# Patient Record
Sex: FEMALE | Race: WHITE | NOT HISPANIC OR LATINO | Employment: OTHER
[De-identification: names, ages, dates, MRNs, and addresses within clinical notes are randomized per-mention and may not be internally consistent; named-entity substitution may affect disease eponyms.]

---

## 2017-06-24 ENCOUNTER — HEALTH MAINTENANCE LETTER (OUTPATIENT)
Age: 62
End: 2017-06-24

## 2020-08-21 DIAGNOSIS — Z11.59 ENCOUNTER FOR SCREENING FOR OTHER VIRAL DISEASES: Primary | ICD-10-CM

## 2020-08-22 DIAGNOSIS — Z11.59 ENCOUNTER FOR SCREENING FOR OTHER VIRAL DISEASES: ICD-10-CM

## 2020-08-22 PROCEDURE — U0003 INFECTIOUS AGENT DETECTION BY NUCLEIC ACID (DNA OR RNA); SEVERE ACUTE RESPIRATORY SYNDROME CORONAVIRUS 2 (SARS-COV-2) (CORONAVIRUS DISEASE [COVID-19]), AMPLIFIED PROBE TECHNIQUE, MAKING USE OF HIGH THROUGHPUT TECHNOLOGIES AS DESCRIBED BY CMS-2020-01-R: HCPCS | Performed by: FAMILY MEDICINE

## 2020-08-23 LAB
SARS-COV-2 RNA SPEC QL NAA+PROBE: NOT DETECTED
SPECIMEN SOURCE: NORMAL

## 2020-08-24 ENCOUNTER — ANESTHESIA EVENT (OUTPATIENT)
Dept: SURGERY | Facility: CLINIC | Age: 65
End: 2020-08-24
Payer: COMMERCIAL

## 2020-08-24 ENCOUNTER — HOSPITAL ENCOUNTER (OUTPATIENT)
Facility: CLINIC | Age: 65
Discharge: HOME | End: 2020-08-24
Attending: UROLOGY | Admitting: UROLOGY
Payer: COMMERCIAL

## 2020-08-24 ENCOUNTER — ANESTHESIA (OUTPATIENT)
Dept: SURGERY | Facility: CLINIC | Age: 65
End: 2020-08-24
Payer: COMMERCIAL

## 2020-08-24 VITALS
OXYGEN SATURATION: 100 % | HEIGHT: 67 IN | HEART RATE: 94 BPM | TEMPERATURE: 95.4 F | SYSTOLIC BLOOD PRESSURE: 127 MMHG | DIASTOLIC BLOOD PRESSURE: 76 MMHG | WEIGHT: 154.7 LBS | RESPIRATION RATE: 14 BRPM | BODY MASS INDEX: 24.28 KG/M2

## 2020-08-24 DIAGNOSIS — N36.8 URETHRAL PROLAPSE: Primary | ICD-10-CM

## 2020-08-24 PROCEDURE — 25800030 ZZH RX IP 258 OP 636: Performed by: NURSE ANESTHETIST, CERTIFIED REGISTERED

## 2020-08-24 PROCEDURE — 88342 IMHCHEM/IMCYTCHM 1ST ANTB: CPT | Mod: 26 | Performed by: UROLOGY

## 2020-08-24 PROCEDURE — 36000050 ZZH SURGERY LEVEL 2 1ST 30 MIN: Performed by: UROLOGY

## 2020-08-24 PROCEDURE — 40000170 ZZH STATISTIC PRE-PROCEDURE ASSESSMENT II: Performed by: UROLOGY

## 2020-08-24 PROCEDURE — 88305 TISSUE EXAM BY PATHOLOGIST: CPT | Mod: 26 | Performed by: UROLOGY

## 2020-08-24 PROCEDURE — 88341 IMHCHEM/IMCYTCHM EA ADD ANTB: CPT | Performed by: UROLOGY

## 2020-08-24 PROCEDURE — 25000566 ZZH SEVOFLURANE, EA 15 MIN: Performed by: UROLOGY

## 2020-08-24 PROCEDURE — 27210794 ZZH OR GENERAL SUPPLY STERILE: Performed by: UROLOGY

## 2020-08-24 PROCEDURE — 25000128 H RX IP 250 OP 636: Performed by: NURSE ANESTHETIST, CERTIFIED REGISTERED

## 2020-08-24 PROCEDURE — 37000008 ZZH ANESTHESIA TECHNICAL FEE, 1ST 30 MIN: Performed by: UROLOGY

## 2020-08-24 PROCEDURE — 71000027 ZZH RECOVERY PHASE 2 EACH 15 MINS: Performed by: UROLOGY

## 2020-08-24 PROCEDURE — 25000125 ZZHC RX 250: Performed by: NURSE ANESTHETIST, CERTIFIED REGISTERED

## 2020-08-24 PROCEDURE — 88342 IMHCHEM/IMCYTCHM 1ST ANTB: CPT | Performed by: UROLOGY

## 2020-08-24 PROCEDURE — 71000013 ZZH RECOVERY PHASE 1 LEVEL 1 EA ADDTL HR: Performed by: UROLOGY

## 2020-08-24 PROCEDURE — 25000132 ZZH RX MED GY IP 250 OP 250 PS 637: Performed by: UROLOGY

## 2020-08-24 PROCEDURE — 25000128 H RX IP 250 OP 636: Performed by: UROLOGY

## 2020-08-24 PROCEDURE — 37000009 ZZH ANESTHESIA TECHNICAL FEE, EACH ADDTL 15 MIN: Performed by: UROLOGY

## 2020-08-24 PROCEDURE — 36000052 ZZH SURGERY LEVEL 2 EA 15 ADDTL MIN: Performed by: UROLOGY

## 2020-08-24 PROCEDURE — 88305 TISSUE EXAM BY PATHOLOGIST: CPT | Performed by: UROLOGY

## 2020-08-24 PROCEDURE — 88341 IMHCHEM/IMCYTCHM EA ADD ANTB: CPT | Mod: 26 | Performed by: UROLOGY

## 2020-08-24 PROCEDURE — 71000012 ZZH RECOVERY PHASE 1 LEVEL 1 FIRST HR: Performed by: UROLOGY

## 2020-08-24 PROCEDURE — 25000125 ZZHC RX 250: Performed by: UROLOGY

## 2020-08-24 RX ORDER — MEPERIDINE HYDROCHLORIDE 25 MG/ML
12.5 INJECTION INTRAMUSCULAR; INTRAVENOUS; SUBCUTANEOUS
Status: DISCONTINUED | OUTPATIENT
Start: 2020-08-24 | End: 2020-08-24 | Stop reason: HOSPADM

## 2020-08-24 RX ORDER — CEFAZOLIN SODIUM 1 G/3ML
1 INJECTION, POWDER, FOR SOLUTION INTRAMUSCULAR; INTRAVENOUS SEE ADMIN INSTRUCTIONS
Status: DISCONTINUED | OUTPATIENT
Start: 2020-08-24 | End: 2020-08-24 | Stop reason: HOSPADM

## 2020-08-24 RX ORDER — DEXAMETHASONE SODIUM PHOSPHATE 4 MG/ML
INJECTION, SOLUTION INTRA-ARTICULAR; INTRALESIONAL; INTRAMUSCULAR; INTRAVENOUS; SOFT TISSUE PRN
Status: DISCONTINUED | OUTPATIENT
Start: 2020-08-24 | End: 2020-08-24

## 2020-08-24 RX ORDER — SODIUM CHLORIDE, SODIUM LACTATE, POTASSIUM CHLORIDE, CALCIUM CHLORIDE 600; 310; 30; 20 MG/100ML; MG/100ML; MG/100ML; MG/100ML
INJECTION, SOLUTION INTRAVENOUS CONTINUOUS PRN
Status: DISCONTINUED | OUTPATIENT
Start: 2020-08-24 | End: 2020-08-24

## 2020-08-24 RX ORDER — FENTANYL CITRATE 50 UG/ML
25-50 INJECTION, SOLUTION INTRAMUSCULAR; INTRAVENOUS
Status: CANCELLED | OUTPATIENT
Start: 2020-08-24

## 2020-08-24 RX ORDER — CYCLOSPORINE 0.5 MG/ML
1 EMULSION OPHTHALMIC 2 TIMES DAILY
COMMUNITY

## 2020-08-24 RX ORDER — HYDRALAZINE HYDROCHLORIDE 20 MG/ML
2.5-5 INJECTION INTRAMUSCULAR; INTRAVENOUS EVERY 10 MIN PRN
Status: DISCONTINUED | OUTPATIENT
Start: 2020-08-24 | End: 2020-08-24 | Stop reason: HOSPADM

## 2020-08-24 RX ORDER — LIDOCAINE HYDROCHLORIDE 20 MG/ML
INJECTION, SOLUTION INFILTRATION; PERINEURAL PRN
Status: DISCONTINUED | OUTPATIENT
Start: 2020-08-24 | End: 2020-08-24

## 2020-08-24 RX ORDER — HYDROMORPHONE HYDROCHLORIDE 1 MG/ML
.3-.5 INJECTION, SOLUTION INTRAMUSCULAR; INTRAVENOUS; SUBCUTANEOUS EVERY 10 MIN PRN
Status: DISCONTINUED | OUTPATIENT
Start: 2020-08-24 | End: 2020-08-24 | Stop reason: HOSPADM

## 2020-08-24 RX ORDER — ONDANSETRON 2 MG/ML
4 INJECTION INTRAMUSCULAR; INTRAVENOUS EVERY 30 MIN PRN
Status: DISCONTINUED | OUTPATIENT
Start: 2020-08-24 | End: 2020-08-24 | Stop reason: HOSPADM

## 2020-08-24 RX ORDER — PROPOFOL 10 MG/ML
INJECTION, EMULSION INTRAVENOUS PRN
Status: DISCONTINUED | OUTPATIENT
Start: 2020-08-24 | End: 2020-08-24

## 2020-08-24 RX ORDER — OXYCODONE HYDROCHLORIDE 5 MG/1
5 TABLET ORAL EVERY 6 HOURS PRN
Qty: 6 TABLET | Refills: 0 | Status: SHIPPED | OUTPATIENT
Start: 2020-08-24 | End: 2020-08-27

## 2020-08-24 RX ORDER — IBANDRONATE SODIUM 150 MG/1
150 TABLET, FILM COATED ORAL
Status: ON HOLD | COMMUNITY
End: 2020-08-24

## 2020-08-24 RX ORDER — DEXAMETHASONE SODIUM PHOSPHATE 4 MG/ML
4 INJECTION, SOLUTION INTRA-ARTICULAR; INTRALESIONAL; INTRAMUSCULAR; INTRAVENOUS; SOFT TISSUE EVERY 10 MIN PRN
Status: DISCONTINUED | OUTPATIENT
Start: 2020-08-24 | End: 2020-08-24 | Stop reason: HOSPADM

## 2020-08-24 RX ORDER — GLYCOPYRROLATE 0.2 MG/ML
INJECTION, SOLUTION INTRAMUSCULAR; INTRAVENOUS PRN
Status: DISCONTINUED | OUTPATIENT
Start: 2020-08-24 | End: 2020-08-24

## 2020-08-24 RX ORDER — SODIUM CHLORIDE, SODIUM LACTATE, POTASSIUM CHLORIDE, CALCIUM CHLORIDE 600; 310; 30; 20 MG/100ML; MG/100ML; MG/100ML; MG/100ML
INJECTION, SOLUTION INTRAVENOUS CONTINUOUS
Status: DISCONTINUED | OUTPATIENT
Start: 2020-08-24 | End: 2020-08-24 | Stop reason: HOSPADM

## 2020-08-24 RX ORDER — EPHEDRINE SULFATE 50 MG/ML
INJECTION, SOLUTION INTRAMUSCULAR; INTRAVENOUS; SUBCUTANEOUS PRN
Status: DISCONTINUED | OUTPATIENT
Start: 2020-08-24 | End: 2020-08-24

## 2020-08-24 RX ORDER — ALENDRONATE SODIUM 70 MG/1
TABLET ORAL
COMMUNITY
Start: 2020-06-18

## 2020-08-24 RX ORDER — OXYCODONE HYDROCHLORIDE 5 MG/1
5 TABLET ORAL ONCE
Status: COMPLETED | OUTPATIENT
Start: 2020-08-24 | End: 2020-08-24

## 2020-08-24 RX ORDER — CEFAZOLIN SODIUM 2 G/100ML
2 INJECTION, SOLUTION INTRAVENOUS
Status: COMPLETED | OUTPATIENT
Start: 2020-08-24 | End: 2020-08-24

## 2020-08-24 RX ORDER — NALOXONE HYDROCHLORIDE 0.4 MG/ML
.1-.4 INJECTION, SOLUTION INTRAMUSCULAR; INTRAVENOUS; SUBCUTANEOUS
Status: DISCONTINUED | OUTPATIENT
Start: 2020-08-24 | End: 2020-08-24 | Stop reason: HOSPADM

## 2020-08-24 RX ORDER — CEPHALEXIN 500 MG/1
500 CAPSULE ORAL 3 TIMES DAILY
Qty: 15 CAPSULE | Refills: 0 | Status: SHIPPED | OUTPATIENT
Start: 2020-08-24 | End: 2020-08-29

## 2020-08-24 RX ORDER — ONDANSETRON 4 MG/1
4 TABLET, ORALLY DISINTEGRATING ORAL EVERY 30 MIN PRN
Status: DISCONTINUED | OUTPATIENT
Start: 2020-08-24 | End: 2020-08-24 | Stop reason: HOSPADM

## 2020-08-24 RX ORDER — FENTANYL CITRATE 0.05 MG/ML
25-50 INJECTION, SOLUTION INTRAMUSCULAR; INTRAVENOUS
Status: DISCONTINUED | OUTPATIENT
Start: 2020-08-24 | End: 2020-08-24 | Stop reason: HOSPADM

## 2020-08-24 RX ORDER — LABETALOL HYDROCHLORIDE 5 MG/ML
10 INJECTION, SOLUTION INTRAVENOUS
Status: DISCONTINUED | OUTPATIENT
Start: 2020-08-24 | End: 2020-08-24 | Stop reason: HOSPADM

## 2020-08-24 RX ADMIN — Medication 10 MG: at 09:17

## 2020-08-24 RX ADMIN — GLYCOPYRROLATE 0.2 MG: 0.2 INJECTION, SOLUTION INTRAMUSCULAR; INTRAVENOUS at 09:42

## 2020-08-24 RX ADMIN — PHENYLEPHRINE HYDROCHLORIDE 100 MCG: 10 INJECTION INTRAVENOUS at 09:26

## 2020-08-24 RX ADMIN — OXYCODONE HYDROCHLORIDE 5 MG: 5 TABLET ORAL at 11:09

## 2020-08-24 RX ADMIN — CEFAZOLIN SODIUM 2 G: 2 INJECTION, SOLUTION INTRAVENOUS at 09:14

## 2020-08-24 RX ADMIN — MIDAZOLAM 2 MG: 1 INJECTION INTRAMUSCULAR; INTRAVENOUS at 09:06

## 2020-08-24 RX ADMIN — Medication 5 MG: at 09:15

## 2020-08-24 RX ADMIN — SODIUM CHLORIDE, POTASSIUM CHLORIDE, SODIUM LACTATE AND CALCIUM CHLORIDE: 600; 310; 30; 20 INJECTION, SOLUTION INTRAVENOUS at 09:53

## 2020-08-24 RX ADMIN — Medication 5 MG: at 09:45

## 2020-08-24 RX ADMIN — SODIUM CHLORIDE, POTASSIUM CHLORIDE, SODIUM LACTATE AND CALCIUM CHLORIDE: 600; 310; 30; 20 INJECTION, SOLUTION INTRAVENOUS at 09:05

## 2020-08-24 RX ADMIN — LIDOCAINE HYDROCHLORIDE 100 MG: 20 INJECTION, SOLUTION INFILTRATION; PERINEURAL at 09:10

## 2020-08-24 RX ADMIN — Medication 5 MG: at 09:26

## 2020-08-24 RX ADMIN — DEXAMETHASONE SODIUM PHOSPHATE 4 MG: 4 INJECTION, SOLUTION INTRA-ARTICULAR; INTRALESIONAL; INTRAMUSCULAR; INTRAVENOUS; SOFT TISSUE at 09:29

## 2020-08-24 RX ADMIN — PHENYLEPHRINE HYDROCHLORIDE 100 MCG: 10 INJECTION INTRAVENOUS at 09:45

## 2020-08-24 RX ADMIN — PROPOFOL 180 MG: 10 INJECTION, EMULSION INTRAVENOUS at 09:10

## 2020-08-24 NOTE — OR NURSING
Dr. Campoverde told this RN to have patient just keep regular bag on patient rather than switch to leg bag.  Catheter will be removed at office in AM.

## 2020-08-24 NOTE — OP NOTE
[facility] Urology Brief Operative Note    Pre-operative diagnosis: Urethral prolapse   Post-operative diagnosis: Same   Procedure: Procedure(s):  CYSTOSCOPY, WITH REPAIR URETHRAL PROLAPSE   Surgeon: Tavo Campoverde MD, MD   Assistant(s): none   Anesthesia: LMA   Estimated blood loss: Less than 10 ml   Total IV fluids: (See anesthesia record)   Blood transfusion: No transfusion was given during surgery   Total urine output: Not measured   Drains: Lugo catheter   Specimens: Urethral prolapse   Implants: None   Findings: 2-3cm urethral prolapse excised   Complications: None   Condition: Stable   Comments: See dictated operative report for full details.    Tavo Campoverde MD, MD

## 2020-08-24 NOTE — ANESTHESIA PREPROCEDURE EVALUATION
"Anesthesia Pre-Procedure Evaluation    Patient: Ivon Sales   MRN: [id number] : 1955          Preoperative Diagnosis: Urethral polyp [N36.2]    Procedure(s):  CYSTOSCOPY, WITH REPAIR URETHRAL PROLAPSE    No past medical history on file.  No past surgical history on file.    Anesthesia Evaluation     . Pt has had prior anesthetic.     No history of anesthetic complications          ROS/MED HX    ENT/Pulmonary:  - neg pulmonary ROS    (-) sleep apnea   Neurologic:  - neg neurologic ROS     Cardiovascular:  - neg cardiovascular ROS      (-) hypertension   METS/Exercise Tolerance:     Hematologic:         Musculoskeletal: Comment: osteoporosis  (+) arthritis,  -       GI/Hepatic:  - neg GI/hepatic ROS      (-) GERD   Renal/Genitourinary:  - ROS Renal section negative       Endo:  - neg endo ROS       Psychiatric:         Infectious Disease:         Malignancy:         Other:                          Physical Exam  Normal systems: cardiovascular, pulmonary and dental    Airway   Mallampati: II  TM distance: >3 FB  Neck ROM: full    Dental     Cardiovascular       Pulmonary             No results found for: WBC, HGB, HCT, PLT, CRP, SED, NA, POTASSIUM, CHLORIDE, CO2, BUN, CR, GLC, WILLIE, PHOS, MAG, ALBUMIN, PROTTOTAL, ALT, AST, GGT, ALKPHOS, BILITOTAL, BILIDIRECT, LIPASE, AMYLASE, CARYN, PTT, INR, FIBR, TSH, T4, T3, HCG, HCGS, CKTOTAL, CKMB, TROPN    Preop Vitals  BP Readings from Last 3 Encounters:   20 124/81   01/22/15 118/70   09 104/64    Pulse Readings from Last 3 Encounters:   20 90   01/22/15 74   09 84      Resp Readings from Last 3 Encounters:   20 18   11 12   09 16    SpO2 Readings from Last 3 Encounters:   20 93%      Temp Readings from Last 1 Encounters:   20 36.1  C (97  F) (Temporal)    Ht Readings from Last 1 Encounters:   20 1.689 m (5' 6.5\")      Wt Readings from Last 1 Encounters:   20 70.2 kg (154 lb 11.2 oz)    Estimated body " "mass index is 24.6 kg/m  as calculated from the following:    Height as of this encounter: 1.689 m (5' 6.5\").    Weight as of this encounter: 70.2 kg (154 lb 11.2 oz).       Anesthesia Plan      History & Physical Review  History and physical reviewed and following examination; no interval change.    ASA Status:  2 .    NPO Status:  > 8 hours    Plan for General with Intravenous and Propofol induction. Maintenance will be Balanced.    PONV prophylaxis:  Ondansetron (or other 5HT-3) and Dexamethasone or Solumedrol         Postoperative Care  Postoperative pain management:  Multi-modal analgesia.      Consents  Anesthetic plan, risks, benefits and alternatives discussed with:  Patient..                 Tatyana Feliz MD  "

## 2020-08-24 NOTE — DISCHARGE INSTRUCTIONS
Cystoscopy Discharge Instructions      Diet:    Return to the diet that you were on before the procedure, unless you are given specific diet instructions.    It is important to drink 6-8 glasses of fluids per day at home - at least 3-4 glasses should be water.    Activity:    Walk short distances and increase as your strength allows.    You may climb stairs.    Do not do strenuous exercise or heavy lifting until approved by surgeon.    Do not drive while taking narcotic pain medications.    Bathing:    You may take a shower.    Call your physician if these signs/symptoms are present:    Pain that is not relieved by a short rest or ordered pain medications.    Temperature at or above 101.0 F or chills.    Inability or difficulty urinating.  Excessive blood in urine.    Any questions or concerns.    DISCHARGE INSTRUCTIONS FOR   CATHETER CARE AT HOME      .      Basic Catheter Care  1. Always wash hands before and after handling your catheter.  2. Use soap and water to wash the area around your catheter.  3. Do this procedure twice a day.  4. Proper cleansing will help keep the area from becoming irritated or infected.    Leg Bag  This is a small plastic bag that collects urine draining from your catheter and then strapped around your thigh. It will need to be emptied when the bag is 1/2 to 3/4 full.     Large Drainage Bag  1. This bag is larger than the leg bag and holds more urine.  It is to be used while at home, especially at night.    2. Before you go to bed, change the leg bag to the large drainage bag.  3. Pinch off the catheter with your fingers and swab the connection between the catheter and leg bag with alcohol sponge.  4. Disconnect the leg bag and connect the large drainage bag to your catheter.  5. When you get into bed, arrange the drainage tubing so that it doesn t kink.  6. Be sure to keep the bag below the level of your bladder and allow enough slack for turning.    Cleaning Your Drainage  Bags    1. Wash hands.  2. Using funnel or syringe, fill the bag half full with a solution of 1/2  vinegar and 1/2 water.  3. Shake bag, allowing mixture to cleanse inside of bag.  4. Empty out all vinegar and water mixture from your bag.  5. Hang bag to dry when not in use.  6. Clean your bags anytime you change them.      Helpful Hints  1. Always keep drainage bags below bladder level to insure adequate drainage.  2. Drink 4-6 glasses of water daily along with other fluids you normally drink to keep urine free of infection and / or clots.  3. If you notice no urine in your bag for 2 to 4 hours or you develop extreme discomfort in bladder area, your catheter maybe plugged.  Notify your doctor.  4. If you notice your urine becomes foul smelling and cloudy, notify your doctor.  Also notify your doctor if you develop fever or chills.  5. If you notice urine leaking around the outside of the catheter, check to be sure catheter or tubing is not kinked.  6. Don t use leg bag while in bed.    Same Day Surgery Discharge Instructions for  Sedation and General Anesthesia       It's not unusual to feel dizzy, light-headed or faint for up to 24 hours after surgery or while taking pain medication.  If you have these symptoms: sit for a few minutes before standing and have someone assist you when you get up to walk or use the bathroom.      You should rest and relax for the next 24 hours. We recommend you make arrangements to have an adult stay with you for at least 24 hours after your discharge.  Avoid hazardous and strenuous activity.      DO NOT DRIVE any vehicle or operate mechanical equipment for 24 hours following the end of your surgery.  Even though you may feel normal, your reactions may be affected by the medication you have received.      Do not drink alcoholic beverages for 24 hours following surgery.       Slowly progress to your regular diet as you feel able. It's not unusual to feel nauseated and/or vomit after  receiving anesthesia.  If you develop these symptoms, drink clear liquids (apple juice, ginger ale, broth, 7-up, etc. ) until you feel better.  If your nausea and vomiting persists for 24 hours, please notify your surgeon.        All narcotic pain medications, along with inactivity and anesthesia, can cause constipation. Drinking plenty of liquids and increasing fiber intake will help.      For any questions of a medical nature, call your surgeon.      Do not make important decisions for 24 hours.      If you had general anesthesia, you may have a sore throat for a couple of days related to the breathing tube used during surgery.  You may use Cepacol lozenges to help with this discomfort.  If it worsens or if you develop a fever, contact your surgeon.       If you feel your pain is not well managed with the pain medications prescribed by your surgeon, please contact your surgeon's office to let them know so they can address your concerns.       CoVid 19 Information    We want to give you information regarding Covid. Please consult your primary care provider with any questions you might have.     Patient who have symptoms (cough, fever, or shortness of breath), need to isolate for 7 days from when symptoms started OR 72 hours after fever resolves (without fever reducing medications) AND improvement of respiratory symptoms (whichever is longer).      Isolate yourself at home (in own room/own bathroom if possible)    Do Not allow any visitors    Do Not go to work or school    Do Not go to [employer],  centers, shopping, or other public places.    Do Not shake hands.    Avoid close and intimate contact with others (hugging, kissing).    Follow CDC recommendations for household cleaning of frequently touched services.     After the initial 7 days, continue to isolate yourself from household members as much as possible. To continue decrease the risk of community spread and exposure, you and any members of your  household should limit activities in public for 14 days after starting home isolation.     You can reference the following CDC link for helpful home isolation/care tips:  https://www.cdc.gov/coronavirus/2019-ncov/downloads/10Things.pdf    Protect Others:    Cover Your Mouth and Nose with a mask, disposable tissue or wash cloth to avoid spreading germs to others.    Wash your hands and face frequently with soap and water    Call Your Primary Doctor If: Breathing difficulty develops or you become worse.    For more information about COVID19 and options for caring for yourself at home, please visit the CDC website at https://www.cdc.gov/coronavirus/2019-ncov/about/steps-when-sick.html  For more options for care at [facility], please visit our website at [email or web address]    **If you have questions or concerns about your procedure,   call Dr. Campoverde at [phone]**    Pt will have catheter removed at clinic Tuesday AM.  Call in AM to let them know you are coming.

## 2020-08-24 NOTE — OP NOTE
Procedure Date: 2020      PROCEDURES:  Cystoscopy and repair of urethral prolapse.      PREOPERATIVE DIAGNOSIS:  Urethral prolapse.      POSTOPERATIVE DIAGNOSIS:  Urethral prolapse.      OPERATIVE NOTE:  Informed consent was obtained from the patient.  She was brought to the operating room, given laryngeal mask anesthesia, and placed in lithotomy position.  Sterile prep and drape were applied and surgical timeout was taken.  A well-lubricated cystoscope was inserted into the urethra and inspection of the bladder and urethral mucosa was performed.  This revealed no evidence of urethral lesions.  No inflammatory or neoplastic changes in the bladder, and the bladder mucosa appeared normal.  Scope was removed, and a 22-Serbian Lugo catheter was placed into the bladder.  The balloon was inflated with 10 mL sterile water and the bladder was drained  this way.  Local infiltration around the urethra with 1% lidocaine with epinephrine was done.  Using electrocautery, the prolapsed portion of the urethral mucosa was excised from 11 o'clock position clockwise to about the 8 o'clock position.  This tissue was sent for pathology.  The urethral edges and the meatal edge were then approximated with interrupted 3-0 chromic sutures.  Once this was done, inspection was performed and revealed excellent hemostasis.  The vaginal speculum, which had been placed at the initiation of the procedure, was then removed and procedure was terminated at that point.  She tolerated the procedure well with no complications.      Blood loss 5 mL, and a total of 7 mL of the local anesthesia was used.  The Lugo catheter will be left in place overnight.  She will go to the office tomorrow and the nurse will schedule to get a trial of voiding done.         NEAL CORBIN MD             D: 2020   T: 2020   MT: GIDEON      Name:     FRANCES NATH   MRN:      [id number]        Account:        [id number]   :      1955            Procedure Date: 08/24/2020      Document: [id number]       cc: Tavo Campoverde MD

## 2020-08-24 NOTE — ANESTHESIA CARE TRANSFER NOTE
Patient: Ivon Sales    Procedure(s):  CYSTOSCOPY WITH REPAIR URETHRAL PROLAPSE    Diagnosis: Urethral polyp [N36.2]  Diagnosis Additional Information: No value filed.    Anesthesia Type:   General     Note:  Airway :Face Mask  Patient transferred to:Phase II  Comments: At end of procedure, spontaneous respirations, adequate tidal volumes, followed commands to voice, LMA removed atraumatically, oropharynx suctioned, airway patent after LMA removal. Oxygen via facemask at 4 liters per minute to PACU. Oxygen tubing connected to wall O2 in PACU, SpO2, NiBP, and EKG monitors and alarms on and functioning, Zeke Hugger warmer connected to patient gown, report on patient's clinical status given to PACU RN, RN questions answered.Handoff Report: Identifed the Patient, Identified the Reponsible Provider, Reviewed the pertinent medical history, Discussed the surgical course, Reviewed Intra-OP anesthesia mangement and issues during anesthesia, Set expectations for post-procedure period and Allowed opportunity for questions and acknowledgement of understanding      Vitals: (Last set prior to Anesthesia Care Transfer)    CRNA VITALS  8/24/2020 0929 - 8/24/2020 1007      8/24/2020             Resp Rate (observed):  (!) 2    Resp Rate (set):  10                Electronically Signed By: BRYON Hanks CRNA  August 24, 2020  10:07 AM

## 2020-08-24 NOTE — ANESTHESIA POSTPROCEDURE EVALUATION
Patient: Ivon Sales    Procedure(s):  CYSTOSCOPY WITH REPAIR URETHRAL PROLAPSE    Diagnosis:Urethral polyp [N36.2]  Diagnosis Additional Information: No value filed.    Anesthesia Type:  General    Note:  Anesthesia Post Evaluation    Patient location during evaluation: PACU  Patient participation: Able to fully participate in evaluation  Level of consciousness: awake  Pain management: adequate  Airway patency: patent  Cardiovascular status: acceptable  Respiratory status: acceptable  Hydration status: acceptable  PONV: none     Anesthetic complications: None          Last vitals:  Vitals:    08/24/20 1115 08/24/20 1145 08/24/20 1205   BP: 133/85 124/76 127/76   Pulse: 94     Resp: 11 14 14   Temp: 35.2  C (95.4  F)     SpO2: 99% 100% 100%         Electronically Signed By: Amarilis De Leon  August 24, 2020  3:39 PM

## 2020-08-26 LAB — COPATH REPORT: NORMAL

## 2021-01-15 ENCOUNTER — HEALTH MAINTENANCE LETTER (OUTPATIENT)
Age: 66
End: 2021-01-15

## 2021-06-24 ENCOUNTER — HOSPITAL ENCOUNTER (OUTPATIENT)
Dept: MAMMOGRAPHY | Facility: CLINIC | Age: 66
Discharge: HOME | End: 2021-06-24
Attending: FAMILY MEDICINE | Admitting: FAMILY MEDICINE
Payer: COMMERCIAL

## 2021-06-24 DIAGNOSIS — Z12.31 VISIT FOR SCREENING MAMMOGRAM: ICD-10-CM

## 2021-06-24 PROCEDURE — 77063 BREAST TOMOSYNTHESIS BI: CPT

## 2021-09-05 ENCOUNTER — HEALTH MAINTENANCE LETTER (OUTPATIENT)
Age: 66
End: 2021-09-05

## 2021-12-01 ENCOUNTER — OFFICE VISIT (OUTPATIENT)
Dept: UROLOGY | Facility: CLINIC | Age: 66
End: 2021-12-01
Attending: OBSTETRICS & GYNECOLOGY
Payer: COMMERCIAL

## 2021-12-01 VITALS
DIASTOLIC BLOOD PRESSURE: 82 MMHG | SYSTOLIC BLOOD PRESSURE: 123 MMHG | HEIGHT: 66 IN | BODY MASS INDEX: 26.02 KG/M2 | HEART RATE: 74 BPM | WEIGHT: 161.9 LBS

## 2021-12-01 DIAGNOSIS — M62.89 HYPERTONUS: ICD-10-CM

## 2021-12-01 DIAGNOSIS — N39.46 MIXED INCONTINENCE URGE AND STRESS: Primary | ICD-10-CM

## 2021-12-01 PROCEDURE — G0463 HOSPITAL OUTPT CLINIC VISIT: HCPCS

## 2021-12-01 PROCEDURE — 99204 OFFICE O/P NEW MOD 45 MIN: CPT | Performed by: OBSTETRICS & GYNECOLOGY

## 2021-12-01 ASSESSMENT — MIFFLIN-ST. JEOR: SCORE: 1288.99

## 2021-12-01 ASSESSMENT — PAIN SCALES - GENERAL: PAINLEVEL: NO PAIN (0)

## 2021-12-01 NOTE — LETTER
12/1/2021       RE: Ivon Sales  [address] [place] [address]     Dear Colleague,    Thank you for referring your patient, Ivon Sales, to the [facility] [place] at [facility]. Please see a copy of my visit note below.    December 1, 2021    Referring Provider: Referred Self, MD  No address on file    Primary Care Provider: Cherie Obrien    CC: feels loose in vaginal area    HPI:  Ivon Sales is a 66 year old female who presents for evaluation of her pelvic floor symptoms.  She has a recent h/o not feeling right in her vaginal area. She denies pelvic pressure or bulge. She states she simply feels loose. Only minimal urinary incontinence. No bowel issues      Prolapse:  Do you feel a vaginal bulge? no                                      Pressure? no   Do you have to place your fingers in the vagina or in the rectum to have a bowel movement? no  Impact to quality of life? -     Stress Incontinence:  Do you leak urine with cough, sneeze, exercise? yes  How often do you leak with cough, sneeze, exercise?  occ  How much do you usually leak? drops   Do you wear a pad? no If so; -  Impact to quality of life? minimal    Urge Incontinence:  Do you often get sudden urges to urinate? yes  How often do have urges? daily  If so, do you leak with these urges? no  How much do you usually leak? -  Impact to quality of life? minimal    Voids/day:8  Nocturia: 2-3  Fluid intake: 3  Caffeine: 1    Urinating:  Difficulty starting urination or strain to void? no  Weak or intermittent stream? no  Incomplete emptying or dribbling? no  Pain or burning with urination? no  Any blood in your urine? no    GI:  Constipation? yes  Frequency stools daily    Straining for stools yes  Stool consistency normal     Ever leak stool (Accidental Bowel Leakage)? no      If so, how often?               -      If so, do you leak?                   -       Soiling without sensation? -  History of irritable bowel or Crohn's? -    Sexual/Pain:  Are you currently having sex?. no  Pain with sex?   yes   Sexual Partner: male  Do any of these symptoms interfere with sex? yes  Impact to quality of life? severe    Prior therapy:  Ever done pelvic floor physical therapy? no  Trial of medication? no  Have you ever tried a pessary? no    Medical History:  Do you have?   High Cholesterol? no     Diabetes? no  High Blood pressure? no     Recurrent UTIs? no  Sleep Apnea? no  Other medical problems: no    Surgical History:    Hysterectomy? no   Bladder Surgery? no   Other? Urethral polyp     OB/Gyn History:  Pregnancies? 0  Deliveries? 0  Vaginal 0  Section 0  Current birth control? -  Periods? -  When was the first day of your last period? -  Last Pap smear? - Any abnormal? -  Last mammogram? -  Last colonoscopy? -    Medications/Vitamins/Supplements: fosamax    Drug Allergies: no    Latex Allergy: no  Iodine Allergy no    Family History: (list relationship and age at diagnosis)  Breast cancer? no   Ovarian cancer? no   Colon cancer? no  Other? no    Social History:  Marital status:   Do you/ have you ever smoke(d)  cigarettes? no  Drink more than 1 alcoholic beverage a day?  yes  Occupation? Retired [employer]    In the past 3 months have you regularly experienced:  Chest pain w/ walking/exercise? no                   Unusual headaches? no  Leg pain w/ walking/exercise? no                       Easy bruising? no  Difficulty breathing w/ walking/exercise? no  Problems with vision? no  Dizziness, falls, or fainting? no  Excessive bleeding from cuts, gums, surgery? no  Other: no    Past Medical History:   Diagnosis Date     Mumps Not sure     Osteopenia        Past Surgical History:   Procedure Laterality Date     ARTHROSCOPY KNEE       CYSTOSCOPY, URETHROPLASTY, COMBINED N/A 2020    Procedure: CYSTOSCOPY WITH REPAIR URETHRAL PROLAPSE;  Surgeon: Tavo Campoverde  "MD Frank;  Location: [facility] OR     FOOT SURGERY Left      larascopy       XR FOOT SURGERY NIKOLE RIGHT         Social History     Socioeconomic History     Marital status:      Spouse name: Not on file     Number of children: Not on file     Years of education: Not on file     Highest education level: Not on file   Occupational History     Not on file   Tobacco Use     Smoking status: Never Smoker     Smokeless tobacco: Never Used   Vaping Use     Vaping Use: Never used   Substance and Sexual Activity     Alcohol use: Yes     Alcohol/week: 1.7 standard drinks     Comment: 3-4 drinks a week     Drug use: No     Sexual activity: Not Currently     Partners: Male     Birth control/protection: Post-menopausal   Other Topics Concern     Parent/sibling w/ CABG, MI or angioplasty before 65F 55M? No   Social History Narrative     Not on file     Social Determinants of Health     Financial Resource Strain: Not on file   Food Insecurity: Not on file   Transportation Needs: Not on file   Physical Activity: Not on file   Stress: Not on file   Social Connections: Not on file   Intimate Partner Violence: Not on file   Housing Stability: Not on file       Family History   Problem Relation Age of Onset     Cancer Mother      Cancer Brother      Cancer Sister        ROS    No Known Allergies    Current Outpatient Medications   Medication     alendronate (FOSAMAX) 70 MG tablet     cycloSPORINE (RESTASIS) 0.05 % ophthalmic emulsion     No current facility-administered medications for this visit.       /82   Pulse 74   Ht 1.689 m (5' 6.5\")   Wt 73.4 kg (161 lb 14.4 oz)   BMI 25.74 kg/m   No LMP recorded. Patient is postmenopausal. Body mass index is 25.74 kg/m .  Ms. Hills is alert, comfortable in no acute distress, non-labored breathing.   Abdomen is soft, non-tender, non-distended, no CVAT.    Normal external female genitalia. The urethra was normal appearing without masses, NT.    She has good support on supine " strain.  Speculum and bimanual exam are remarkable for atrophic vaginal mucosa, but o/w normal.      3/5 kegels.     POPQ  Aa -2   Ba -2  C -7  D -9  GH 3  PB 3  TVL 9  Ap -2  Bp -2    neg SST  VOID 0 ml  PVR 0 mL in and out cath  Urine dip ND    A/P: Ivon Sales is a 66 year old F with pelvic floor hypertonus    Refer to PFPT    I spent a total of 45 minutes with  Ms. Bolivar  on the date of the encounter in chart review, face to face patient visit, review of tests, documentation and/or discussion with other providers about the issues documented above.    Brando Fuentes MD  Professor, OB/GYN  Urogynecologist  CC  Patient Care Team:  Cherie Obrien MD as PCP - General (Family Medicine)  Jimena Tomlin PA-C as Physician Assistant (Urology)  Brando Fuentes MD as MD (OB/Gyn)  SELF, REFERRED

## 2021-12-01 NOTE — PROGRESS NOTES
December 1, 2021    Referring Provider: Referred Self, MD  No address on file    Primary Care Provider: Cherie Obrien    CC: feels loose in vaginal area    HPI:  Ivon Sales is a 66 year old female who presents for evaluation of her pelvic floor symptoms.  She has a recent h/o not feeling right in her vaginal area. She denies pelvic pressure or bulge. She states she simply feels loose. Only minimal urinary incontinence. No bowel issues      Prolapse:  Do you feel a vaginal bulge? no                                      Pressure? no   Do you have to place your fingers in the vagina or in the rectum to have a bowel movement? no  Impact to quality of life? -     Stress Incontinence:  Do you leak urine with cough, sneeze, exercise? yes  How often do you leak with cough, sneeze, exercise?  occ  How much do you usually leak? drops   Do you wear a pad? no If so; -  Impact to quality of life? minimal    Urge Incontinence:  Do you often get sudden urges to urinate? yes  How often do have urges? daily  If so, do you leak with these urges? no  How much do you usually leak? -  Impact to quality of life? minimal    Voids/day:8  Nocturia: 2-3  Fluid intake: 3  Caffeine: 1    Urinating:  Difficulty starting urination or strain to void? no  Weak or intermittent stream? no  Incomplete emptying or dribbling? no  Pain or burning with urination? no  Any blood in your urine? no    GI:  Constipation? yes  Frequency stools daily    Straining for stools yes  Stool consistency normal     Ever leak stool (Accidental Bowel Leakage)? no      If so, how often?               -      If so, do you leak?                   -      Soiling without sensation? -  History of irritable bowel or Crohn's? -    Sexual/Pain:  Are you currently having sex?. no  Pain with sex?   yes   Sexual Partner: male  Do any of these symptoms interfere with sex? yes  Impact to quality of life? severe    Prior therapy:  Ever done pelvic floor physical therapy?  no  Trial of medication? no  Have you ever tried a pessary? no    Medical History:  Do you have?   High Cholesterol? no     Diabetes? no  High Blood pressure? no     Recurrent UTIs? no  Sleep Apnea? no  Other medical problems: no    Surgical History:    Hysterectomy? no   Bladder Surgery? no   Other? Urethral polyp     OB/Gyn History:  Pregnancies? 0  Deliveries? 0  Vaginal 0  Section 0  Current birth control? -  Periods? -  When was the first day of your last period? -  Last Pap smear? - Any abnormal? -  Last mammogram? -  Last colonoscopy? -    Medications/Vitamins/Supplements: fosamax    Drug Allergies: no    Latex Allergy: no  Iodine Allergy no    Family History: (list relationship and age at diagnosis)  Breast cancer? no   Ovarian cancer? no   Colon cancer? no  Other? no    Social History:  Marital status:   Do you/ have you ever smoke(d)  cigarettes? no  Drink more than 1 alcoholic beverage a day?  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 no                   Unusual headaches? no  Leg pain w/ walking/exercise? no                       Easy bruising? no  Difficulty breathing w/ walking/exercise? no  Problems with vision? no  Dizziness, falls, or fainting? no  Excessive bleeding from cuts, gums, surgery? no  Other: no    Past Medical History:   Diagnosis Date     Mumps Not sure     Osteopenia        Past Surgical History:   Procedure Laterality Date     ARTHROSCOPY KNEE       CYSTOSCOPY, URETHROPLASTY, COMBINED N/A 2020    Procedure: CYSTOSCOPY WITH REPAIR URETHRAL PROLAPSE;  Surgeon: Tavo Campoverde MD;  Location: [facility] OR     FOOT SURGERY Left      larascopy       XR FOOT SURGERY NIKOLE RIGHT         Social History     Socioeconomic History     Marital status:      Spouse name: Not on file     Number of children: Not on file     Years of education: Not on file     Highest education level: Not on  "file   Occupational History     Not on file   Tobacco Use     Smoking status: Never Smoker     Smokeless tobacco: Never Used   Vaping Use     Vaping Use: Never used   Substance and Sexual Activity     Alcohol use: Yes     Alcohol/week: 1.7 standard drinks     Comment: 3-4 drinks a week     Drug use: No     Sexual activity: Not Currently     Partners: Male     Birth control/protection: Post-menopausal   Other Topics Concern     Parent/sibling w/ CABG, MI or angioplasty before 65F 55M? No   Social History Narrative     Not on file     Social Determinants of Health     Financial Resource Strain: Not on file   Food Insecurity: Not on file   Transportation Needs: Not on file   Physical Activity: Not on file   Stress: Not on file   Social Connections: Not on file   Intimate Partner Violence: Not on file   Housing Stability: Not on file       Family History   Problem Relation Age of Onset     Cancer Mother      Cancer Brother      Cancer Sister        ROS    No Known Allergies    Current Outpatient Medications   Medication     alendronate (FOSAMAX) 70 MG tablet     cycloSPORINE (RESTASIS) 0.05 % ophthalmic emulsion     No current facility-administered medications for this visit.       /82   Pulse 74   Ht 1.689 m (5' 6.5\")   Wt 73.4 kg (161 lb 14.4 oz)   BMI 25.74 kg/m   No LMP recorded. Patient is postmenopausal. Body mass index is 25.74 kg/m .  Ms. Hills is alert, comfortable in no acute distress, non-labored breathing.   Abdomen is soft, non-tender, non-distended, no CVAT.    Normal external female genitalia. The urethra was normal appearing without masses, NT.    She has good support on supine strain.  Speculum and bimanual exam are remarkable for atrophic vaginal mucosa, but o/w normal.      3/5 kegels.     POPQ  Aa -2   Ba -2  C -7  D -9  GH 3  PB 3  TVL 9  Ap -2  Bp -2    neg SST  VOID 0 ml  PVR 0 mL in and out cath  Urine dip ND    A/P: Ivon Sales is a 66 year old F with pelvic floor " hypertonus    Refer to PFPT    I spent a total of 45 minutes with  Ms. Bolivar  on the date of the encounter in chart review, face to face patient visit, review of tests, documentation and/or discussion with other providers about the issues documented above.    Brando Fuentes MD  Professor, OB/GYN  Urogynecologist  CC  Patient Care Team:  Cherie Obrien MD as PCP - General (Family Medicine)  Jimena Tomlin PA-C as Physician Assistant (Urology)  Brando Fuentes MD as MD (OB/Gyn)  SELF, REFERRED

## 2022-01-13 DIAGNOSIS — R55 SYNCOPE: Primary | ICD-10-CM

## 2022-02-20 ENCOUNTER — HEALTH MAINTENANCE LETTER (OUTPATIENT)
Age: 67
End: 2022-02-20

## 2022-04-06 ENCOUNTER — THERAPY VISIT (OUTPATIENT)
Dept: PHYSICAL THERAPY | Facility: CLINIC | Age: 67
End: 2022-04-06
Attending: OBSTETRICS & GYNECOLOGY
Payer: COMMERCIAL

## 2022-04-06 DIAGNOSIS — M99.05 PELVIC SOMATIC DYSFUNCTION: ICD-10-CM

## 2022-04-06 DIAGNOSIS — M62.838 MUSCLE SPASM: ICD-10-CM

## 2022-04-06 DIAGNOSIS — N39.46 MIXED INCONTINENCE URGE AND STRESS: ICD-10-CM

## 2022-04-06 PROCEDURE — 97535 SELF CARE MNGMENT TRAINING: CPT | Mod: GP | Performed by: PHYSICAL THERAPIST

## 2022-04-06 PROCEDURE — 97161 PT EVAL LOW COMPLEX 20 MIN: CPT | Mod: GP | Performed by: PHYSICAL THERAPIST

## 2022-04-06 PROCEDURE — 97140 MANUAL THERAPY 1/> REGIONS: CPT | Mod: GP | Performed by: PHYSICAL THERAPIST

## 2022-04-06 PROCEDURE — 97110 THERAPEUTIC EXERCISES: CPT | Mod: GP | Performed by: PHYSICAL THERAPIST

## 2022-04-06 NOTE — PROGRESS NOTES
Physical Therapy Initial Evaluation  Subjective:  HPI  SUBJECTIVE:  Patient reports onset of symptoms of feeling loose down in perineum area starting about a year ago for unknown reasons.  Saw Dr. Obrien her PCP and was told she may have had a prolapse.  Feels she may have a prolapse.  Saw urologist Dr. Fuentes in Dec 2021 and was told on exam that everything looked fine.  Symptoms include feeling of needing to urinate often, some urgency and frequency.  Reports wakes up 3 times a night to urinate.  Pt denies heaviness or pressure down in perineum.  Denies having to push or support anything vaginally.  Pt reports symptoms do not worsen the longer she is on her feet or towards end of day.   Since onset symptoms have been getting better, worse or staying the same? Same.  Pt denies LBP, denies pelvic pain.  PMHx inclues elbow fracture and R knee scope 2017.      Urination:  Do you leak on the way to the bathroom or with a strong urge to void? Sometimes but seldom    Do you leak with cough,sneeze, jumping, running?no - but feels slight feeling that she might   Any other activities that cause leaking? NA  Do you have triggers that make you feel you can't wait to go to the bathroom? No.  Type of pad and number used per day? NA  When you leak what is the amount? Small if at all    How long can you delay the need to urinate? Reports she can wait 2-3 hours between voids, daytime voids about 5 times.   How many times do you get up to urinate at night? 3  Can you stop the flow of urine when on the toilet? Yes  Is the volume of urine passed usually: medium. (8sec rule=  250ml with average bladder storing  400-600ml)    Do you strain to pass urine? No  Do you have a slow or hesitant urinary stream? No  Do you have difficulty initiating the urine stream? No  Do you feel you fully empty your bladder?  Yes    How many bladder infections have you had in last 12 months? no    Fluid intake(one glass is 8oz or one cup) 3 glasses/day,  1 time a week may have tea,  5 times a week may have some alcohol..    Bowel habits:  Frequency of bowel movements?7 times a week  Consistancy of stool? soft formed  Do you ignore the urge to defecate? No  Do you strain to pass stool? Yes - sometimes    Pelvic Pain:  When do you have pelvic pain? NA  Is initial penetration during intercourse painful? Yes  Is deeper penetration painful? Yes  Do you use lubricant? A little  Given birth? No  - never pregnant.  Does have 2 adopted daughters.  Are you sexually active?No - it's been a few years.  Too painful so stopped.  Have you ever been worried for your physical safety? Yes  Any abdominal or pelvic surgeries? laproscopic surgery in 90's trying to get pregnant  Are you having any regular exercise? Walk with warm weather  Have you practiced the PF(kegel) exercises for 4 or more weeks?no    Marinoff Scale:Level 3  (Level 3: Abstinence from intercourse because of severe pain. Level 2: Painful intercourse which limites frequency of activity. Level 1: Painful intercourse not severe enough to prevent activity.)                                    Objective:  System         Lumbar/SI Evaluation  ROM:  AROM Lumbar: normal                                                Pelvic Dysfunction Evaluation:        Flexibility:    Tightness present at:Adductors; Piriformis and Gluteals      Pelvic Clock Exam:    Ischiocavernosis pain:  -  Bulbocavernosis pain:  -  Transverse Perineal:  -  Levator ANI:  +  Perineal Body:  -      External Assessment:  External assessment pelvic: elevate perineal body at rest, no prolapse noted with supine strain.  Skin Condition:  Atrophic    Bearing Down/Coughing:  Normal    Introitus:  Normal  Muscle Contraction/Perineal Mobility:  Elevation and urogential triangle descent and substitution  Internal Assessment:  Internal assessment pelvic: P = 3/5, E = 10 sec, F = 10 reps, min delay relaxation noted with palpation.    Contraction/Grade:  Fair squeeze,  definite lift (3)  Accessory Muscle use-Abdominals:  Yes - but after cuing able to isolate PFM better                  Hip Evaluation          Functional Testing:          Quad:      Bilateral leg squat:  Excessive anterior knee excursion and decreased hip/trunk flexion                  General     ROS    Assessment/Plan:    Patient is a 66 year old female with pelvic complaints.    Patient has the following significant findings with corresponding treatment plan.                Diagnosis 1:  Pelvic floor dysfunction  Pain -  manual therapy, self management, education and home program  Decreased ROM/flexibility - manual therapy, therapeutic exercise, therapeutic activity and home program  Impaired muscle performance - biofeedback, neuro re-education and home program  Decreased function - therapeutic activities and home program  Impaired posture - neuro re-education, therapeutic activities and home program    Therapy Evaluation Codes:   1) History comprised of:   Personal factors that impact the plan of care:      None.    Comorbidity factors that impact the plan of care are:      Menopausal and osteoporosis.     Medications impacting care: Bone density.  2) Examination of Body Systems comprised of:   Body structures and functions that impact the plan of care:      Pelvis.   Activity limitations that impact the plan of care are:      ADLs.  3) Clinical presentation characteristics are:   Stable/Uncomplicated.  4) Decision-Making    Low complexity using standardized patient assessment instrument and/or measureable assessment of functional outcome.  Cumulative Therapy Evaluation is: Low complexity.    Previous and current functional limitations:  (See Goal Flow Sheet for this information)    Short term and Long term goals: (See Goal Flow Sheet for this information)     Communication ability:  Patient appears to be able to clearly communicate and understand verbal and written communication and follow directions  correctly.  Treatment Explanation - The following has been discussed with the patient:   RX ordered/plan of care  Anticipated outcomes  Possible risks and side effects  This patient would benefit from PT intervention to resume normal activities.   Rehab potential is good.    Frequency:  1 X week, once daily  Duration:  for 6 weeks  Discharge Plan:  Achieve all LTG.  Independent in home treatment program.  Reach maximal therapeutic benefit.    Please refer to the daily flowsheet for treatment today, total treatment time and time spent performing 1:1 timed codes.

## 2022-04-07 NOTE — PROGRESS NOTES
Physical Therapy Initial Evaluation  Subjective:    Patient Health History             Pertinent medical history includes: history of fractures, menopausal and osteoporosis.     Medical allergies: NKDA.   Surgeries include:  Orthopedic surgery (L elbow R knee).    Current medications:  Bone density.    Current occupation is Retired.                                       Objective:  System    Physical Exam    General     ROS    Assessment/Plan:

## 2022-04-13 ENCOUNTER — THERAPY VISIT (OUTPATIENT)
Dept: PHYSICAL THERAPY | Facility: CLINIC | Age: 67
End: 2022-04-13
Attending: OBSTETRICS & GYNECOLOGY
Payer: COMMERCIAL

## 2022-04-13 DIAGNOSIS — M99.05 PELVIC SOMATIC DYSFUNCTION: Primary | ICD-10-CM

## 2022-04-13 DIAGNOSIS — M62.838 MUSCLE SPASM: ICD-10-CM

## 2022-04-13 PROCEDURE — 97530 THERAPEUTIC ACTIVITIES: CPT | Mod: GP | Performed by: PHYSICAL THERAPIST

## 2022-04-13 PROCEDURE — 97140 MANUAL THERAPY 1/> REGIONS: CPT | Mod: GP | Performed by: PHYSICAL THERAPIST

## 2022-10-22 ENCOUNTER — HEALTH MAINTENANCE LETTER (OUTPATIENT)
Age: 67
End: 2022-10-22

## 2022-11-11 ENCOUNTER — HOSPITAL ENCOUNTER (OUTPATIENT)
Dept: MAMMOGRAPHY | Facility: CLINIC | Age: 67
Discharge: HOME | End: 2022-11-11
Attending: FAMILY MEDICINE | Admitting: FAMILY MEDICINE
Payer: COMMERCIAL

## 2022-11-11 DIAGNOSIS — Z12.31 VISIT FOR SCREENING MAMMOGRAM: ICD-10-CM

## 2022-11-11 PROCEDURE — 77067 SCR MAMMO BI INCL CAD: CPT

## 2022-12-05 ENCOUNTER — MEDICAL CORRESPONDENCE (OUTPATIENT)
Dept: HEALTH INFORMATION MANAGEMENT | Facility: CLINIC | Age: 67
End: 2022-12-05

## 2022-12-05 DIAGNOSIS — E78.5 HYPERLIPEMIA: Primary | ICD-10-CM

## 2022-12-13 ENCOUNTER — HOSPITAL ENCOUNTER (OUTPATIENT)
Dept: CARDIOLOGY | Facility: CLINIC | Age: 67
Discharge: HOME | End: 2022-12-13
Attending: FAMILY MEDICINE | Admitting: FAMILY MEDICINE

## 2022-12-13 DIAGNOSIS — E78.5 HYPERLIPEMIA: ICD-10-CM

## 2022-12-13 PROCEDURE — 75571 CT HRT W/O DYE W/CA TEST: CPT | Mod: 26 | Performed by: INTERNAL MEDICINE

## 2022-12-13 PROCEDURE — 75571 CT HRT W/O DYE W/CA TEST: CPT

## 2023-04-01 ENCOUNTER — HEALTH MAINTENANCE LETTER (OUTPATIENT)
Age: 68
End: 2023-04-01

## 2023-11-07 ENCOUNTER — MEDICAL CORRESPONDENCE (OUTPATIENT)
Dept: HEALTH INFORMATION MANAGEMENT | Facility: CLINIC | Age: 68
End: 2023-11-07
Payer: COMMERCIAL

## 2023-11-07 ENCOUNTER — TRANSFERRED RECORDS (OUTPATIENT)
Dept: HEALTH INFORMATION MANAGEMENT | Facility: CLINIC | Age: 68
End: 2023-11-07
Payer: COMMERCIAL

## 2023-11-08 DIAGNOSIS — I51.7 LAE (LEFT ATRIAL ENLARGEMENT): Primary | ICD-10-CM

## 2023-11-08 DIAGNOSIS — I45.10 RBBB: ICD-10-CM

## 2023-11-13 ENCOUNTER — HOSPITAL ENCOUNTER (OUTPATIENT)
Dept: CARDIOLOGY | Facility: CLINIC | Age: 68
Discharge: HOME | End: 2023-11-13
Attending: FAMILY MEDICINE | Admitting: FAMILY MEDICINE
Payer: COMMERCIAL

## 2023-11-13 DIAGNOSIS — I45.10 RBBB: ICD-10-CM

## 2023-11-13 DIAGNOSIS — I51.7 LAE (LEFT ATRIAL ENLARGEMENT): ICD-10-CM

## 2023-11-13 LAB — LVEF ECHO: NORMAL

## 2023-11-13 PROCEDURE — 93306 TTE W/DOPPLER COMPLETE: CPT | Mod: 26 | Performed by: INTERNAL MEDICINE

## 2023-11-13 PROCEDURE — 93306 TTE W/DOPPLER COMPLETE: CPT

## 2024-01-17 ENCOUNTER — ANCILLARY PROCEDURE (OUTPATIENT)
Dept: MAMMOGRAPHY | Facility: CLINIC | Age: 69
End: 2024-01-17
Attending: FAMILY MEDICINE
Payer: COMMERCIAL

## 2024-01-17 DIAGNOSIS — Z12.31 VISIT FOR SCREENING MAMMOGRAM: ICD-10-CM

## 2024-01-17 PROCEDURE — 77063 BREAST TOMOSYNTHESIS BI: CPT | Mod: TC | Performed by: RADIOLOGY

## 2024-01-17 PROCEDURE — 77067 SCR MAMMO BI INCL CAD: CPT | Mod: TC | Performed by: RADIOLOGY

## 2024-06-02 ENCOUNTER — HEALTH MAINTENANCE LETTER (OUTPATIENT)
Age: 69
End: 2024-06-02

## 2025-02-12 ENCOUNTER — ANCILLARY PROCEDURE (OUTPATIENT)
Dept: MAMMOGRAPHY | Facility: CLINIC | Age: 70
End: 2025-02-12
Attending: FAMILY MEDICINE
Payer: COMMERCIAL

## 2025-02-12 DIAGNOSIS — Z12.31 VISIT FOR SCREENING MAMMOGRAM: ICD-10-CM

## 2025-02-12 PROCEDURE — 77067 SCR MAMMO BI INCL CAD: CPT | Mod: TC | Performed by: RADIOLOGY

## 2025-02-12 PROCEDURE — 77063 BREAST TOMOSYNTHESIS BI: CPT | Mod: TC | Performed by: RADIOLOGY

## 2025-06-15 ENCOUNTER — HEALTH MAINTENANCE LETTER (OUTPATIENT)
Age: 70
End: 2025-06-15

## (undated) DEVICE — SYR BULB IRRIG 50ML LATEX FREE 0035280

## (undated) DEVICE — CATH FOLEY 22FR 5ML LATEX 0165SI22

## (undated) DEVICE — GLOVE PROTEXIS W/NEU-THERA 8.0  2D73TE80

## (undated) DEVICE — SU CHROMIC 3-0 SH 27" G122H

## (undated) DEVICE — SYR 10ML FINGER CONTROL W/O NDL 309695

## (undated) DEVICE — NDL COUNTER 10CT

## (undated) DEVICE — SYR 10ML LL W/O NDL 302995

## (undated) DEVICE — Device

## (undated) DEVICE — SOL WATER IRRIG 1000ML BOTTLE 2F7114

## (undated) DEVICE — ESU PENCIL W/HOLSTER E2350H

## (undated) DEVICE — GOWN IMPERVIOUS SPECIALTY XLG/XLONG 32474

## (undated) DEVICE — LINEN TOWEL PACK X5 5464

## (undated) DEVICE — ESU GROUND PAD UNIVERSAL W/O CORD

## (undated) DEVICE — PACK TVT HYSTEROSCOPY SMA15HYFSE

## (undated) RX ORDER — LIDOCAINE HYDROCHLORIDE AND EPINEPHRINE 10; 10 MG/ML; UG/ML
INJECTION, SOLUTION INFILTRATION; PERINEURAL
Status: DISPENSED
Start: 2020-08-24

## (undated) RX ORDER — FENTANYL CITRATE 50 UG/ML
INJECTION, SOLUTION INTRAMUSCULAR; INTRAVENOUS
Status: DISPENSED
Start: 2020-08-24

## (undated) RX ORDER — CEFAZOLIN SODIUM 2 G/100ML
INJECTION, SOLUTION INTRAVENOUS
Status: DISPENSED
Start: 2020-08-24

## (undated) RX ORDER — DEXAMETHASONE SODIUM PHOSPHATE 4 MG/ML
INJECTION, SOLUTION INTRA-ARTICULAR; INTRALESIONAL; INTRAMUSCULAR; INTRAVENOUS; SOFT TISSUE
Status: DISPENSED
Start: 2020-08-24

## (undated) RX ORDER — OXYCODONE HYDROCHLORIDE 5 MG/1
TABLET ORAL
Status: DISPENSED
Start: 2020-08-24